# Patient Record
Sex: FEMALE | Race: WHITE | NOT HISPANIC OR LATINO | Employment: UNEMPLOYED | URBAN - METROPOLITAN AREA
[De-identification: names, ages, dates, MRNs, and addresses within clinical notes are randomized per-mention and may not be internally consistent; named-entity substitution may affect disease eponyms.]

---

## 2018-09-12 ENCOUNTER — CLINICAL SUPPORT (OUTPATIENT)
Dept: AUDIOLOGY | Facility: CLINIC | Age: 83
End: 2018-09-12
Payer: COMMERCIAL

## 2018-09-12 DIAGNOSIS — H90.3 SENSORINEURAL HEARING LOSS, BILATERAL: Primary | ICD-10-CM

## 2018-09-12 PROCEDURE — 99499 UNLISTED E&M SERVICE: CPT | Mod: S$GLB,,, | Performed by: AUDIOLOGIST

## 2018-09-12 NOTE — PROGRESS NOTES
Patient in for hearing aid check.  Complaining of not hearing well out of right aid.  Also complaining of continued trouble hearing in the dining mendez due to background noise.  Cleaned and checked both aids.  Right aid wax guard was full, changed wax guards on both aids and listening check revealed good sound quality for both.  Programming changes made to hearing aids.  Increased overall gain and also decreased gain for low frequencies and soft sounds to help with background noise.  Pt pleased with sound quality and will call with any further problems.

## 2019-05-07 ENCOUNTER — TELEPHONE (OUTPATIENT)
Dept: OTOLARYNGOLOGY | Facility: CLINIC | Age: 84
End: 2019-05-07

## 2019-05-07 NOTE — TELEPHONE ENCOUNTER
----- Message from Cintia Mitchell MA sent at 5/3/2019  3:12 PM CDT -----  Contact: patient  Please call patient regarding appt., states she had audio in 9/12/2018.  ----- Message -----  From: Jami Henley  Sent: 5/3/2019   3:01 PM  To: Alisha CHANDLER Staff    Please call above patient at [phone] has vertigo did not want to schedule an audio I told her a nurse would call and explain to her she said okay waiting on a call from the nurse thanks

## 2019-05-15 ENCOUNTER — CLINICAL SUPPORT (OUTPATIENT)
Dept: AUDIOLOGY | Facility: CLINIC | Age: 84
End: 2019-05-15
Payer: MEDICARE

## 2019-05-15 DIAGNOSIS — H81.12 BENIGN PAROXYSMAL POSITIONAL VERTIGO OF LEFT EAR: ICD-10-CM

## 2019-05-15 DIAGNOSIS — H90.3 SENSORINEURAL HEARING LOSS, BILATERAL: Primary | ICD-10-CM

## 2019-05-15 PROCEDURE — 92557 PR COMPREHENSIVE HEARING TEST: ICD-10-PCS | Mod: S$GLB,,, | Performed by: AUDIOLOGIST

## 2019-05-15 PROCEDURE — 92557 COMPREHENSIVE HEARING TEST: CPT | Mod: S$GLB,,, | Performed by: AUDIOLOGIST

## 2019-05-15 PROCEDURE — 95992 PR CANALITH REPOSITIONING PROCEDURE, PER DAY: ICD-10-PCS | Mod: S$GLB,,, | Performed by: AUDIOLOGIST

## 2019-05-15 PROCEDURE — 95992 CANALITH REPOSITIONING PROC: CPT | Mod: S$GLB,,, | Performed by: AUDIOLOGIST

## 2019-05-15 NOTE — PROGRESS NOTES
Click on link to view Audiogram  Document on 5/15/2019 10:48 AM by Jenny Jaquez MA CCC-A: Audiogram    Patient seen today for hearing aid check and Tung-Hallpike test.  Patient complaining of positional vertigo especially when turning to the left side.  Tung-Hallpike test completed and patient is mildly positive to the Left side suggesting BPPV.  An Epley maneuver was completed to the left side.  Patient will follow up in one week if she is still dizzy.  Patient is also complaining of not hearing as well with hearing aids.  An Audiogram was completed and showed a decrease in hearing thresholds bilaterally from her last hearing test 9/6/17.  I reprogrammed her hearing aids to her current Audiogram and also cleaned both aids.  Listening check confirmed aids are working well and patient is pleased with sound quality.  She will call with any further issues.

## 2020-07-06 ENCOUNTER — TELEPHONE (OUTPATIENT)
Dept: CARDIOLOGY | Facility: CLINIC | Age: 85
End: 2020-07-06

## 2020-07-06 NOTE — TELEPHONE ENCOUNTER
----- Message from Amanda Dillon sent at 7/6/2020  4:34 PM CDT -----  Regarding: Call Back  Name of Who is Calling : JAYDA OSWALDO [[id number]]    Patient is requesting a call from staff in regards to question in regards to her records   .....Please contact to further discuss and advise.    Can the clinic reply by [facility] : No    What Number to Call Back :  [phone]

## 2020-07-14 ENCOUNTER — OFFICE VISIT (OUTPATIENT)
Dept: CARDIOLOGY | Facility: CLINIC | Age: 85
End: 2020-07-14
Payer: MEDICARE

## 2020-07-14 VITALS
BODY MASS INDEX: 22.66 KG/M2 | HEART RATE: 82 BPM | DIASTOLIC BLOOD PRESSURE: 74 MMHG | OXYGEN SATURATION: 96 % | HEIGHT: 63 IN | WEIGHT: 127.88 LBS | SYSTOLIC BLOOD PRESSURE: 134 MMHG

## 2020-07-14 DIAGNOSIS — I45.2 RBBB (RIGHT BUNDLE BRANCH BLOCK WITH LEFT ANTERIOR FASCICULAR BLOCK): ICD-10-CM

## 2020-07-14 DIAGNOSIS — I35.0 AORTIC VALVE STENOSIS, ETIOLOGY OF CARDIAC VALVE DISEASE UNSPECIFIED: ICD-10-CM

## 2020-07-14 DIAGNOSIS — I25.10 CORONARY ARTERY DISEASE INVOLVING NATIVE CORONARY ARTERY OF NATIVE HEART, ANGINA PRESENCE UNSPECIFIED: Primary | ICD-10-CM

## 2020-07-14 PROCEDURE — 99213 OFFICE O/P EST LOW 20 MIN: CPT | Mod: PBBFAC | Performed by: INTERNAL MEDICINE

## 2020-07-14 PROCEDURE — 99999 PR PBB SHADOW E&M-EST. PATIENT-LVL III: ICD-10-PCS | Mod: PBBFAC,,, | Performed by: INTERNAL MEDICINE

## 2020-07-14 PROCEDURE — 99999 PR PBB SHADOW E&M-EST. PATIENT-LVL III: CPT | Mod: PBBFAC,,, | Performed by: INTERNAL MEDICINE

## 2020-07-14 PROCEDURE — 99204 PR OFFICE/OUTPT VISIT, NEW, LEVL IV, 45-59 MIN: ICD-10-PCS | Mod: S$PBB,,, | Performed by: INTERNAL MEDICINE

## 2020-07-14 PROCEDURE — 99204 OFFICE O/P NEW MOD 45 MIN: CPT | Mod: S$PBB,,, | Performed by: INTERNAL MEDICINE

## 2020-07-14 RX ORDER — DORZOLAMIDE HYDROCHLORIDE AND TIMOLOL MALEATE 20; 5 MG/ML; MG/ML
1 SOLUTION/ DROPS OPHTHALMIC
COMMUNITY
Start: 2019-07-26 | End: 2020-07-14

## 2020-07-14 RX ORDER — LOVASTATIN 20 MG/1
20 TABLET ORAL DAILY
COMMUNITY
Start: 2020-06-02 | End: 2020-12-04 | Stop reason: ALTCHOICE

## 2020-07-14 RX ORDER — NAPROXEN SODIUM 220 MG/1
81 TABLET, FILM COATED ORAL
COMMUNITY
Start: 2019-08-09 | End: 2020-08-08

## 2020-07-14 RX ORDER — LATANOPROST 50 UG/ML
1 SOLUTION/ DROPS OPHTHALMIC
COMMUNITY

## 2020-07-14 RX ORDER — VITAMIN B COMPLEX
TABLET ORAL DAILY PRN
COMMUNITY

## 2020-07-14 RX ORDER — CARVEDILOL 6.25 MG/1
6.25 TABLET ORAL DAILY
COMMUNITY

## 2020-07-14 RX ORDER — IBUPROFEN 100 MG/5ML
1000 SUSPENSION, ORAL (FINAL DOSE FORM) ORAL
COMMUNITY

## 2020-07-14 RX ORDER — AMLODIPINE BESYLATE 10 MG/1
10 TABLET ORAL DAILY
COMMUNITY
Start: 2020-06-17

## 2020-07-14 RX ORDER — OMEGA-3 FATTY ACIDS 1000 MG
2 CAPSULE ORAL
COMMUNITY

## 2020-07-14 RX ORDER — CLOPIDOGREL BISULFATE 75 MG/1
75 TABLET ORAL DAILY
COMMUNITY
Start: 2020-05-21 | End: 2020-12-04

## 2020-07-14 RX ORDER — TRAMADOL HYDROCHLORIDE 50 MG/1
50 TABLET ORAL EVERY 6 HOURS PRN
COMMUNITY

## 2020-07-14 RX ORDER — CHOLECALCIFEROL (VITAMIN D3) 50 MCG
4000 TABLET ORAL
COMMUNITY

## 2020-07-14 NOTE — PROGRESS NOTES
Cardiology    7/14/2020  10:01 AM    Problem list  Patient Active Problem List   Diagnosis    RBBB (right bundle branch block with left anterior fascicular block)    Aortic valve stenosis    Coronary artery disease       CC:  TAVR    HPI:  Patient was last seen in November.  She is doing well.  She denies any exertional chest pain or dyspnea on exertion.  She is scheduled for epidural and will need to be off Plavix.  Her TAVR procedure was in August 2019.    Medications  No current outpatient medications on file.     No current facility-administered medications for this visit.       Prior to Admission medications    Not on File         History  No past medical history on file.  No past surgical history on file.  Social History     Socioeconomic History    Marital status:      Spouse name: Not on file    Number of children: Not on file    Years of education: Not on file    Highest education level: Not on file   Occupational History    Not on file   Social Needs    Financial resource strain: Not on file    Food insecurity     Worry: Not on file     Inability: Not on file    Transportation needs     Medical: Not on file     Non-medical: Not on file   Tobacco Use    Smoking status: Not on file   Substance and Sexual Activity    Alcohol use: Not on file    Drug use: Not on file    Sexual activity: Not on file   Lifestyle    Physical activity     Days per week: Not on file     Minutes per session: Not on file    Stress: Not on file   Relationships    Social connections     Talks on phone: Not on file     Gets together: Not on file     Attends [employer] service: Not on file     Active member of club or organization: Not on file     Attends meetings of clubs or organizations: Not on file     Relationship status: Not on file   Other Topics Concern    Not on file   Social History Narrative    Not on file         Allergies  Review of patient's allergies indicates:  Not on File      Review of  Systems   Review of Systems   Constitution: Negative for decreased appetite, fever and weight loss.   HENT: Negative for congestion and nosebleeds.    Eyes: Negative for double vision, vision loss in left eye, vision loss in right eye and visual disturbance.   Cardiovascular: Negative for chest pain, claudication, cyanosis, dyspnea on exertion, irregular heartbeat, leg swelling, near-syncope, orthopnea, palpitations, paroxysmal nocturnal dyspnea and syncope.   Respiratory: Negative for cough, hemoptysis, shortness of breath, sleep disturbances due to breathing, snoring, sputum production and wheezing.    Endocrine: Negative for cold intolerance and heat intolerance.   Skin: Negative for nail changes and rash.   Musculoskeletal: Negative for joint pain, muscle cramps, muscle weakness and myalgias.   Gastrointestinal: Negative for change in bowel habit, heartburn, hematemesis, hematochezia, hemorrhoids and melena.   Neurological: Negative for dizziness, focal weakness and headaches.         Physical Exam  Wt Readings from Last 1 Encounters:   No data found for Wt     BP Readings from Last 3 Encounters:   No data found for BP     Pulse Readings from Last 1 Encounters:   No data found for Pulse     Physical Exam   Constitutional: She is oriented to person, place, and time. She appears well-developed.   Cardiovascular: Normal rate, regular rhythm, S1 normal and S2 normal.   Murmur heard.   Early systolic murmur is present with a grade of 1/6.  Pulses:       Carotid pulses are 2+ on the right side and 2+ on the left side.       Radial pulses are 2+ on the right side and 2+ on the left side.        Dorsalis pedis pulses are 2+ on the right side and 2+ on the left side.   Pulmonary/Chest: Breath sounds normal.   Musculoskeletal:         General: No edema.   Neurological: She is alert and oriented to person, place, and time.   Skin: Skin is warm and dry.   Vitals reviewed.                Assessment  1. Coronary artery  disease involving native coronary artery of native heart, angina presence unspecified  Stable    2. Aortic valve stenosis, etiology of cardiac valve disease unspecified  Status post TAVR in August 7, 2019    3. RBBB (right bundle branch block with left anterior fascicular block)  Stable        Plan and Discussion  Continue aspirin 81 mg daily.  Discussed with [facility] TAVR coordinator.  OK to stop Plavix for epidural.  [facility] TAVR program will perform echocardiogram to assess TAVR.    Follow Up  4 months    Time spent evaluating and treating patient 25 minutes with >50% of this time being face-to-face.     Mesfin You MD, F.A.C.C, F.S.C.A.I.

## 2020-08-12 ENCOUNTER — CLINICAL SUPPORT (OUTPATIENT)
Dept: AUDIOLOGY | Facility: CLINIC | Age: 85
End: 2020-08-12
Payer: MEDICARE

## 2020-08-12 DIAGNOSIS — H90.3 SENSORINEURAL HEARING LOSS, BILATERAL: Primary | ICD-10-CM

## 2020-08-12 PROCEDURE — 92557 COMPREHENSIVE HEARING TEST: CPT | Mod: S$GLB,,, | Performed by: AUDIOLOGIST

## 2020-08-12 PROCEDURE — 92550 TYMPANOMETRY & REFLEX THRESH: CPT | Mod: S$GLB,,, | Performed by: AUDIOLOGIST

## 2020-08-12 PROCEDURE — 92557 PR COMPREHENSIVE HEARING TEST: ICD-10-PCS | Mod: S$GLB,,, | Performed by: AUDIOLOGIST

## 2020-08-12 PROCEDURE — 92550 PR TYMPANOMETRY AND REFLEX THRESHOLD MEASUREMENTS: ICD-10-PCS | Mod: S$GLB,,, | Performed by: AUDIOLOGIST

## 2020-08-12 NOTE — PROGRESS NOTES
Click on link to view Audiogram  Document on 8/12/2020 10:35 AM by Jenny Jaquez MA CCC-A: Audiogram     Yocasta Salas was seen today for an annual Audiogram and hearing aid follow up.  She currently wears a pair of Oticon Nera2 Pro miniRITE hearing aids dispensed May 2016.  She reported that neither hearing aids have been working for a few months.    Tympanometry revealed a Type A tympanogram for both ears.  Audiogram results revealed a mild to moderate-severe sensorineural hearing loss bilaterally.  Speech audiometry revealed a speech reception threshold at 40dBHL with a word recognition score of 92% for the right ear and a speech reception threshold at 40dBHL with a word recognition score of 84% for the left ear.  There was no significant change in hearing since her last Audiogram 5/2019.    I cleaned both hearing aids and changed the wax guards.  The listening check revealed good sound quality for both hearing aids.  The patient also reported good sound quality after the hearing aids were cleaned.  She does not feel that she needs any adjustments made to her hearing aids.     Recommendations:  1. Annual Audiogram and hearing aid evaluation  2. Hearing aid follow up as needed  3. Continue use of hearing aids

## 2020-11-13 RX ORDER — CARVEDILOL 6.25 MG/1
6.25 TABLET ORAL DAILY
Status: CANCELLED | OUTPATIENT
Start: 2020-11-13

## 2020-12-04 ENCOUNTER — OFFICE VISIT (OUTPATIENT)
Dept: CARDIOLOGY | Facility: CLINIC | Age: 85
End: 2020-12-04
Payer: MEDICARE

## 2020-12-04 VITALS
SYSTOLIC BLOOD PRESSURE: 136 MMHG | BODY MASS INDEX: 23.42 KG/M2 | HEIGHT: 63 IN | DIASTOLIC BLOOD PRESSURE: 78 MMHG | WEIGHT: 132.19 LBS | HEART RATE: 80 BPM

## 2020-12-04 DIAGNOSIS — I25.10 CORONARY ARTERY DISEASE INVOLVING NATIVE CORONARY ARTERY OF NATIVE HEART, ANGINA PRESENCE UNSPECIFIED: Primary | ICD-10-CM

## 2020-12-04 DIAGNOSIS — I45.2 RBBB (RIGHT BUNDLE BRANCH BLOCK WITH LEFT ANTERIOR FASCICULAR BLOCK): ICD-10-CM

## 2020-12-04 DIAGNOSIS — I35.0 AORTIC VALVE STENOSIS, ETIOLOGY OF CARDIAC VALVE DISEASE UNSPECIFIED: ICD-10-CM

## 2020-12-04 PROCEDURE — 99214 PR OFFICE/OUTPT VISIT, EST, LEVL IV, 30-39 MIN: ICD-10-PCS | Mod: S$PBB,,, | Performed by: INTERNAL MEDICINE

## 2020-12-04 PROCEDURE — 99213 OFFICE O/P EST LOW 20 MIN: CPT | Mod: PBBFAC | Performed by: INTERNAL MEDICINE

## 2020-12-04 PROCEDURE — 99999 PR PBB SHADOW E&M-EST. PATIENT-LVL III: CPT | Mod: PBBFAC,,, | Performed by: INTERNAL MEDICINE

## 2020-12-04 PROCEDURE — 99999 PR PBB SHADOW E&M-EST. PATIENT-LVL III: ICD-10-PCS | Mod: PBBFAC,,, | Performed by: INTERNAL MEDICINE

## 2020-12-04 PROCEDURE — 99214 OFFICE O/P EST MOD 30 MIN: CPT | Mod: S$PBB,,, | Performed by: INTERNAL MEDICINE

## 2020-12-04 RX ORDER — ACETAMINOPHEN 325 MG/1
325 TABLET ORAL EVERY 6 HOURS PRN
COMMUNITY

## 2020-12-04 RX ORDER — PRAVASTATIN SODIUM 10 MG/1
10 TABLET ORAL NIGHTLY
COMMUNITY
Start: 2020-11-18

## 2020-12-04 NOTE — PROGRESS NOTES
Cardiology    12/4/2020  11:23 AM    Problem list  Patient Active Problem List   Diagnosis    RBBB (right bundle branch block with left anterior fascicular block)    Aortic valve stenosis    Coronary artery disease       CC:  TAVR    HPI:  Saw Dr. Rowland few months ago at TAVR clinic.  Echo done in September which showed normal TAVR position.  Patient made appointment today to get clearance for back injections.  She could not get a sooner appointment with Dr. Mcnulty ([facility], [place] cardiologist).  She denies any angina, dyspnea on exertion, palpitation or syncope.  Main complaint is numbness to both feet and weakness in her knees.    Medications  Current Outpatient Medications   Medication Sig Dispense Refill    acetaminophen (TYLENOL) 325 MG tablet Take 325 mg by mouth every 6 (six) hours as needed for Pain.      alendronate sodium (ALENDRONATE ORAL) 70 mg once daily.      amLODIPine (NORVASC) 10 MG tablet 10 mg once daily.      ascorbic acid, vitamin C, (VITAMIN C) 1000 MG tablet Take 1,000 mg by mouth.      carvediloL (COREG) 6.25 MG tablet Take 6.25 mg by mouth once daily. 3.125mg qAM and 6.25mg qPM      cholecalciferol, vitamin D3, (VITAMIN D3) 50 mcg (2,000 unit) Tab Take 4,000 Units by mouth.       cyanocobalamin, vitamin B-12, (VITAMIN B-12) 2,500 mcg Subl Place under the tongue daily as needed.      latanoprost 0.005 % ophthalmic solution Apply 1 drop to eye.      omega-3 fatty acids 1,000 mg Cap Take 2 g by mouth.      pravastatin (PRAVACHOL) 10 MG tablet Take 10 mg by mouth nightly.      traMADoL (ULTRAM) 50 mg tablet Take 50 mg by mouth every 6 (six) hours as needed for Pain.      vit A/vit C/vit E/zinc/copper (ICAPS AREDS ORAL) Take by mouth.      aspirin 81 MG Chew Take 81 mg by mouth.       No current facility-administered medications for this visit.       Prior to Admission medications    Medication Sig Start Date End Date Taking? Authorizing Provider   acetaminophen (TYLENOL) 325 MG tablet  Take 325 mg by mouth every 6 (six) hours as needed for Pain.   Yes Historical Provider   alendronate sodium (ALENDRONATE ORAL) 70 mg once daily.   Yes Historical Provider   amLODIPine (NORVASC) 10 MG tablet 10 mg once daily. 6/17/20  Yes Historical Provider   ascorbic acid, vitamin C, (VITAMIN C) 1000 MG tablet Take 1,000 mg by mouth.   Yes Historical Provider   carvediloL (COREG) 6.25 MG tablet Take 6.25 mg by mouth once daily. 3.125mg qAM and 6.25mg qPM   Yes Historical Provider   cholecalciferol, vitamin D3, (VITAMIN D3) 50 mcg (2,000 unit) Tab Take 4,000 Units by mouth.    Yes Historical Provider   cyanocobalamin, vitamin B-12, (VITAMIN B-12) 2,500 mcg Subl Place under the tongue daily as needed.   Yes Historical Provider   latanoprost 0.005 % ophthalmic solution Apply 1 drop to eye.   Yes Historical Provider   omega-3 fatty acids 1,000 mg Cap Take 2 g by mouth.   Yes Historical Provider   pravastatin (PRAVACHOL) 10 MG tablet Take 10 mg by mouth nightly. 11/18/20  Yes Historical Provider   traMADoL (ULTRAM) 50 mg tablet Take 50 mg by mouth every 6 (six) hours as needed for Pain.   Yes Historical Provider   vit A/vit C/vit E/zinc/copper (ICAPS AREDS ORAL) Take by mouth.   Yes Historical Provider   aspirin 81 MG Chew Take 81 mg by mouth. 8/9/19 8/8/20  Historical Provider   clopidogreL (PLAVIX) 75 mg tablet 75 mg once daily. 5/21/20 12/4/20  Historical Provider   lovastatin (MEVACOR) 20 MG tablet 20 mg once daily. 6/2/20 12/4/20  Historical Provider         History  No past medical history on file.  No past surgical history on file.  Social History     Socioeconomic History    Marital status:      Spouse name: Not on file    Number of children: Not on file    Years of education: Not on file    Highest education level: Not on file   Occupational History    Not on file   Social Needs    Financial resource strain: Not on file    Food insecurity     Worry: Not on file     Inability: Not on file     Transportation needs     Medical: Not on file     Non-medical: Not on file   Tobacco Use    Smoking status: Never Smoker    Smokeless tobacco: Never Used   Substance and Sexual Activity    Alcohol use: Not Currently    Drug use: Not on file    Sexual activity: Not on file   Lifestyle    Physical activity     Days per week: Not on file     Minutes per session: Not on file    Stress: Not on file   Relationships    Social connections     Talks on phone: Not on file     Gets together: Not on file     Attends [employer] service: Not on file     Active member of club or organization: Not on file     Attends meetings of clubs or organizations: Not on file     Relationship status: Not on file   Other Topics Concern    Not on file   Social History Narrative    Not on file         Allergies  Review of patient's allergies indicates:   Allergen Reactions    Amoxicillin-pot clavulanate Hives     PATIENT STATES SHE IS NOT ALLERGIC    Doxycycline Other (See Comments)     unsure    Moxifloxacin Other (See Comments)     Other reaction(s): OTHER      Nsaids (non-steroidal anti-inflammatory drug) Other (See Comments)     Hx of Gi ulcer         Review of Systems   Review of Systems   Constitution: Negative for decreased appetite, fever and weight loss.   HENT: Negative for congestion and nosebleeds.    Eyes: Negative for double vision, vision loss in left eye, vision loss in right eye and visual disturbance.   Cardiovascular: Negative for chest pain, claudication, cyanosis, dyspnea on exertion, irregular heartbeat, leg swelling, near-syncope, orthopnea, palpitations, paroxysmal nocturnal dyspnea and syncope.   Respiratory: Negative for cough, hemoptysis, shortness of breath, sleep disturbances due to breathing, snoring, sputum production and wheezing.    Endocrine: Negative for cold intolerance and heat intolerance.   Skin: Negative for nail changes and rash.   Musculoskeletal: Negative for joint pain, muscle cramps,  muscle weakness and myalgias.   Gastrointestinal: Negative for change in bowel habit, heartburn, hematemesis, hematochezia, hemorrhoids and melena.   Neurological: Negative for dizziness, focal weakness and headaches.         Physical Exam  Wt Readings from Last 1 Encounters:   12/04/20 59.9 kg (132 lb 2.7 oz)     BP Readings from Last 3 Encounters:   12/04/20 136/78   07/14/20 134/74     Pulse Readings from Last 1 Encounters:   12/04/20 80     Body mass index is 23.41 kg/m².    Physical Exam   Constitutional: She is oriented to person, place, and time. She appears well-developed.   Cardiovascular: Normal rate, regular rhythm, S1 normal and S2 normal.   Murmur heard.   Early systolic murmur is present with a grade of 1/6.  Pulses:       Carotid pulses are 2+ on the right side and 2+ on the left side.       Radial pulses are 2+ on the right side and 2+ on the left side.        Dorsalis pedis pulses are 2+ on the right side and 2+ on the left side.   Pulmonary/Chest: Breath sounds normal.   Musculoskeletal:         General: No edema.   Neurological: She is alert and oriented to person, place, and time.   Skin: Skin is warm and dry.   Vitals reviewed.                Assessment  1. Coronary artery disease involving native coronary artery of native heart, angina presence unspecified  stable    2. Aortic valve stenosis, etiology of cardiac valve disease unspecified  S/p TAVR    3. RBBB (right bundle branch block with left anterior fascicular block)  stable        Plan and Discussion  Continue current medication.  Her last echocardiogram showed normal left ventricular function and normal TAVR.  She has no unstable cardiac symptoms.  She has acceptable risk to proceed with back injections.  No further cardiac testing necessary.    Follow Up  PRN.  She has appointment with Dr. Mcnulty in January 2021.    Time spent evaluating and treating patient 20 minutes with >50% of this time being face-to-face.     Mesfin You MD,  COLLEEN, [place].

## 2021-03-31 ENCOUNTER — CLINICAL SUPPORT (OUTPATIENT)
Dept: AUDIOLOGY | Facility: CLINIC | Age: 86
End: 2021-03-31
Payer: MEDICARE

## 2021-03-31 DIAGNOSIS — H90.3 SENSORINEURAL HEARING LOSS, BILATERAL: Primary | ICD-10-CM

## 2021-03-31 PROCEDURE — 99499 NO LOS: ICD-10-PCS | Mod: S$GLB,,, | Performed by: AUDIOLOGIST

## 2021-03-31 PROCEDURE — 99499 UNLISTED E&M SERVICE: CPT | Mod: S$GLB,,, | Performed by: AUDIOLOGIST
